# Patient Record
Sex: MALE | Race: WHITE | NOT HISPANIC OR LATINO | URBAN - METROPOLITAN AREA
[De-identification: names, ages, dates, MRNs, and addresses within clinical notes are randomized per-mention and may not be internally consistent; named-entity substitution may affect disease eponyms.]

---

## 2021-01-09 ENCOUNTER — APPOINTMENT (RX ONLY)
Dept: URBAN - METROPOLITAN AREA CLINIC 303 | Facility: CLINIC | Age: 26
Setting detail: DERMATOLOGY
End: 2021-01-09

## 2021-01-09 DIAGNOSIS — L29.8 OTHER PRURITUS: ICD-10-CM | Status: WORSENING

## 2021-01-09 DIAGNOSIS — Z00.00 ENCOUNTER FOR GENERAL ADULT MEDICAL EXAMINATION WITHOUT ABNORMAL FINDINGS: ICD-10-CM | Status: INADEQUATELY CONTROLLED

## 2021-01-09 DIAGNOSIS — L29.89 OTHER PRURITUS: ICD-10-CM | Status: WORSENING

## 2021-01-09 PROBLEM — Z71.1 PERSON WITH FEARED HEALTH COMPLAINT IN WHOM NO DIAGNOSIS IS MADE: Status: ACTIVE | Noted: 2021-01-09

## 2021-01-09 PROCEDURE — ? FULL BODY SKIN EXAM - DECLINED

## 2021-01-09 PROCEDURE — 99204 OFFICE O/P NEW MOD 45 MIN: CPT

## 2021-01-09 PROCEDURE — ? COUNSELING

## 2021-01-09 PROCEDURE — ? TREATMENT REGIMEN

## 2021-01-09 PROCEDURE — ? ORDER TESTS

## 2021-01-09 PROCEDURE — ? PRESCRIPTION

## 2021-01-09 RX ORDER — FLUOCINOLONE ACETONIDE 0.25 MG/G
CREAM TOPICAL
Qty: 1 | Refills: 0 | Status: ERX | COMMUNITY
Start: 2021-01-09

## 2021-01-09 RX ADMIN — FLUOCINOLONE ACETONIDE: 0.25 CREAM TOPICAL at 00:00

## 2021-01-09 ASSESSMENT — LOCATION ZONE DERM: LOCATION ZONE: TRUNK

## 2021-01-09 ASSESSMENT — LOCATION SIMPLE DESCRIPTION DERM: LOCATION SIMPLE: GROIN

## 2021-01-09 ASSESSMENT — LOCATION DETAILED DESCRIPTION DERM: LOCATION DETAILED: SUPRAPUBIC SKIN

## 2021-01-09 NOTE — PROCEDURE: ORDER TESTS
PHYSICAL EXAM:   General: NAD, well-groomed, well-developed, sleeping comfortably in bed  HEENT: NC/AT, EOMI, neck supple, trachea midline, no masses, moist MM  Respiratory: Symmetric breath sounds, CTAB, no wheezes, rales, rhonchi or rubs  Cardiovascular: No JVD, RRR, Normal S1, S2, no murmurs, gallops, rubs, S3, or S4, capillary refill < 2 sec  Abdominal: Soft, NT/ND, no guarding, normoactive bowel sounds  Extremities: No clubbing, cyanosis, LE edema, 2+ peripheral pulses   Musculoskeletal: No deformities, pain, or joint swelling, FROM  Neurological: non-focal, no weakness or sensory deficits  Skin: No rashes, bruises, lacerations, or lesions   Lymphatic: multiple b/l ant cervical shotty lymph nodes
Bill For Surgical Tray: no
Performing Laboratory: [id number]
Expected Date Of Service: 01/09/2021
Billing Type: Third-Party Bill

## 2021-01-09 NOTE — PROCEDURE: TREATMENT REGIMEN
Samples Given: Cereve moisturizer cream
Plan: -[facility] notes [facility] given miconizole & UA was performed & results came back negative.\\n-Will send pt out for bloodwork (VDRL)\\n-will prescribe topical steroid & contact pt with results once obtained.
Detail Level: Zone
Initiate Treatment: Fluocinolone .025% cream BID x 2 week, QD x 1 week, every other day x 1 week then prn

## 2021-01-09 NOTE — PROCEDURE: MIPS QUALITY
Quality 130: Documentation Of Current Medications In The Medical Record: Current Medications Documented
Detail Level: Detailed
Quality 110: Preventive Care And Screening: Influenza Immunization: Influenza Immunization Administered during Influenza season
Quality 431: Preventive Care And Screening: Unhealthy Alcohol Use - Screening: Patient screened for unhealthy alcohol use using a single question and scores less than 2 times per year
Quality 128: Preventive Care And Screening: Body Mass Index (Bmi) Screening And Follow-Up Plan: BMI is documented within normal parameters and no follow-up plan is required.
Quality 226: Preventive Care And Screening: Tobacco Use: Screening And Cessation Intervention: Tobacco Screening not Performed for Medical Reasons

## 2021-01-09 NOTE — HPI: ITCHING (GENITALS)
How Did The Itching Occur?: sudden in onset (over a period of weeks to a few months)
How Severe Is The Itching?: mild
Additional History: [facility] & was prescribed an anti fungal for possible yeast, pt noted some improvement however has not resolved & STD test was performed & came back negative